# Patient Record
Sex: FEMALE | Race: AMERICAN INDIAN OR ALASKA NATIVE
[De-identification: names, ages, dates, MRNs, and addresses within clinical notes are randomized per-mention and may not be internally consistent; named-entity substitution may affect disease eponyms.]

---

## 2019-03-13 ENCOUNTER — HOSPITAL ENCOUNTER (EMERGENCY)
Dept: HOSPITAL 5 - ED | Age: 44
LOS: 1 days | Discharge: HOME | End: 2019-03-14
Payer: COMMERCIAL

## 2019-03-13 DIAGNOSIS — R42: Primary | ICD-10-CM

## 2019-03-13 LAB
BASOPHILS # (AUTO): 0.1 K/MM3 (ref 0–0.1)
BASOPHILS NFR BLD AUTO: 0.8 % (ref 0–1.8)
BUN SERPL-MCNC: 11 MG/DL (ref 7–17)
BUN/CREAT SERPL: 14 %
CALCIUM SERPL-MCNC: 9 MG/DL (ref 8.4–10.2)
EOSINOPHIL # BLD AUTO: 0.1 K/MM3 (ref 0–0.4)
EOSINOPHIL NFR BLD AUTO: 1.6 % (ref 0–4.3)
HCT VFR BLD CALC: 36.6 % (ref 30.3–42.9)
HEMOLYSIS INDEX: 5
HGB BLD-MCNC: 12.4 GM/DL (ref 10.1–14.3)
LYMPHOCYTES # BLD AUTO: 2.8 K/MM3 (ref 1.2–5.4)
LYMPHOCYTES NFR BLD AUTO: 38.4 % (ref 13.4–35)
MCHC RBC AUTO-ENTMCNC: 34 % (ref 30–34)
MCV RBC AUTO: 89 FL (ref 79–97)
MONOCYTES # (AUTO): 0.6 K/MM3 (ref 0–0.8)
MONOCYTES % (AUTO): 7.6 % (ref 0–7.3)
PLATELET # BLD: 280 K/MM3 (ref 140–440)
RBC # BLD AUTO: 4.12 M/MM3 (ref 3.65–5.03)

## 2019-03-13 PROCEDURE — 81025 URINE PREGNANCY TEST: CPT

## 2019-03-13 PROCEDURE — 85610 PROTHROMBIN TIME: CPT

## 2019-03-13 PROCEDURE — 93010 ELECTROCARDIOGRAM REPORT: CPT

## 2019-03-13 PROCEDURE — 70450 CT HEAD/BRAIN W/O DYE: CPT

## 2019-03-13 PROCEDURE — 36415 COLL VENOUS BLD VENIPUNCTURE: CPT

## 2019-03-13 PROCEDURE — 85379 FIBRIN DEGRADATION QUANT: CPT

## 2019-03-13 PROCEDURE — 93005 ELECTROCARDIOGRAM TRACING: CPT

## 2019-03-13 PROCEDURE — 85025 COMPLETE CBC W/AUTO DIFF WBC: CPT

## 2019-03-13 PROCEDURE — 80048 BASIC METABOLIC PNL TOTAL CA: CPT

## 2019-03-13 PROCEDURE — 85730 THROMBOPLASTIN TIME PARTIAL: CPT

## 2019-03-13 NOTE — EMERGENCY DEPARTMENT REPORT
ED Dizziness HPI





- General


Chief Complaint: Dizziness


Stated Complaint: DIZZINESS/L ARM NUMBNESS


Time Seen by Provider: 03/13/19 21:14


Source: patient


Mode of arrival: Ambulatory


Limitations: No Limitations





- History of Present Illness


Initial Comments: 





Patient is 43 years old female with no significant past medical history.  

Patient brought to the emergency room accompanied by her mother stating that 

patient started to have dizziness and generalized numbness.  Patient described 

her dizziness has since all movement and she feels the room is spinning.  This 

is associated with change in position.  Patient denied any chest pain or 

shortness of breath.  Patient stated that her heart was racing when she came to 

the ER.


MD Complaint: dizziness


-: This afternoon


Timing: gradual onset


Description: sense of movement, "room spinning"


History of Same: Yes


Severity: moderate


Improves With: remaining still


Worsens With: position


Associated Symptoms: denies other symptoms





- Related Data


                                    Allergies











Allergy/AdvReac Type Severity Reaction Status Date / Time


 


No Known Allergies Allergy   Unverified 03/13/19 21:13














ED Review of Systems


ROS: 


Stated complaint: DIZZINESS/L ARM NUMBNESS


Other details as noted in HPI





Comment: All other systems reviewed and negative


Constitutional: denies: chills, fever


Respiratory: denies: cough, orthopnea, shortness of breath, SOB with exertion, 

SOB at rest, wheezing


Cardiovascular: denies: chest pain, palpitations


Gastrointestinal: denies: abdominal pain, nausea, vomiting, diarrhea, 

constipation, hematemesis, melena, hematochezia


Musculoskeletal: denies: back pain


Neurological: denies: headache, weakness, numbness, paresthesias, confusion





ED Physical Exam





- General


Limitations: No Limitations


General appearance: alert, in no apparent distress





- Head


Head exam: Present: atraumatic, normocephalic, normal inspection





- Eye


Eye exam: Present: normal appearance, PERRL





- ENT


ENT exam: Present: normal exam, normal orophraynx, mucous membranes moist





- Neck


Neck exam: Present: normal inspection, full ROM.  Absent: tenderness, 

meningismus, lymphadenopathy, thyromegaly





- Respiratory


Respiratory exam: Present: normal lung sounds bilaterally





- Cardiovascular


Cardiovascular Exam: Present: regular rate, normal rhythm, normal heart sounds





- GI/Abdominal


GI/Abdominal exam: Present: soft, normal bowel sounds.  Absent: distended, 

tenderness, guarding, rebound, rigid, organomegaly, mass, bruit, pulsatile mass,

hernia





- Extremities Exam


Extremities exam: Present: normal inspection, full ROM, normal capillary refill





- Back Exam


Back exam: Present: normal inspection, full ROM.  Absent: CVA tenderness (R), 

CVA tenderness (L), paraspinal tenderness, vertebral tenderness





- Neurological Exam


Neurological exam: Present: alert, oriented X3, CN II-XII intact, normal gait, 

reflexes normal





- Skin


Skin exam: Present: warm, intact, normal color





ED Course


                                   Vital Signs











  03/13/19 03/13/19





  21:20 23:38


 


Temperature 97.9 F 97.9 F


 


Pulse Rate 101 H 75


 


Respiratory 18 14





Rate  


 


Blood Pressure  122/68


 


Blood Pressure 135/87 122/68





[Left]  


 


O2 Sat by Pulse 100 97





Oximetry  














ED Medical Decision Making





- Lab Data


Result diagrams: 


                                 03/13/19 21:26





                                 03/13/19 21:26





- Radiology Data


Radiology results: report reviewed





CT brain is negative for acute finding.





- Medical Decision Making





Patient is 43 years old female with no significant past medical history.  

Patient brought to the emergency room accompanied by her mother stating that 

patient started to have dizziness and generalized numbness.  Patient described 

her dizziness has since all movement and she feels the room is spinning.  This 

is associated with change in position.  Patient denied any chest pain or 

shortness of breath.  Patient stated that her heart was racing when she came to 

the ER.





Patient stated that she is feeling much better.  CT brain is negative for acute 

findings.  Labs reviewed and is negative.  D-dimer is negative.  EKG is 

unremarkable.  I believe the patient has positional vertigo.  Patient received 

meclizine in the ER.  I will prescribe meclizine for the patient and advised her

 to follow up with her primary care physician in the next 2-3 days and to return

 to the ER if her symptoms are not improved.


Critical care attestation.: 


If time is entered above; I have spent that time in minutes in the direct care 

of this critically ill patient, excluding procedure time.








ED Disposition


Clinical Impression: 


 Dizziness, Positional vertigo





Disposition: DC-01 TO HOME OR SELFCARE


Is pt being admited?: No


Condition: Stable


Instructions:  Vertigo (ED), Dizziness (ED)


Referrals: 


ART WILSON MD [Primary Care Provider] - 3-5 Days


Forms:  Work/School Release Form(ED)

## 2019-03-13 NOTE — CAT SCAN REPORT
PROCEDURE:  CT HEAD/BRAIN WO CON 

 

TECHNIQUE:  Computerized tomography of the head was performed without contrast material.  

 

CT DOSE LENGTH PRODUCT: mGycm 

 

HISTORY: dizziness 

 

COMPARISONS:  None . 

 

FINDINGS: 

 

Skull and scalp:  Normal . 

Paranasal sinuses:  Small polypoid lesion measuring over centimeters is noted involving the left sphe
noid sinus consistent with mucous retention cyst. . 

Ventricles and subarachnoid spaces:  Normal . 

Cerebrum:  No evidence of hemorrhage, acute infarction or mass . 

Cerebellum and brainstem:  No evidence of hemorrhage, acute infarction or mass . 

Vasculature:  Normal . 

Other:  None . 

ASPECTS: 10 

 

IMPRESSION: No acute intracranial abnormality. 

 

This document is electronically signed by Gurpreet López MD., March 13 2019 11:49:44 PM ET

## 2019-03-13 NOTE — EMERGENCY DEPARTMENT REPORT
Stated Complaint: DIZZINESS/L ARM NUMBNESS


Time Seen by Provider: 03/13/19 21:14





- HPI


History of Present Illness: 





This is a 43 y.o. female that presents with dizziness and BUE numbness for 2 

hours.





- ROS


Review of Systems: 





palpitations, dizziness, and numbness





- Exam


Vital Signs: 


                                   Vital Signs











  03/13/19





  21:20


 


Temperature 97.9 F


 


Pulse Rate 101 H


 


Respiratory 18





Rate 


 


Blood Pressure 135/87





[Left] 


 


O2 Sat by Pulse 100





Oximetry 











MSE screening note: 


Focused history and physical exam performed.


Due to findings the following was ordered:





EKG, labs, POC glucose








ED Disposition for MSE


Condition: Stable

## 2019-03-14 VITALS — SYSTOLIC BLOOD PRESSURE: 104 MMHG | DIASTOLIC BLOOD PRESSURE: 64 MMHG

## 2019-03-14 LAB
APTT BLD: 30.3 SEC. (ref 24.2–36.6)
INR PPP: 0.87 (ref 0.87–1.13)